# Patient Record
Sex: FEMALE | Race: WHITE
[De-identification: names, ages, dates, MRNs, and addresses within clinical notes are randomized per-mention and may not be internally consistent; named-entity substitution may affect disease eponyms.]

---

## 2018-11-30 ENCOUNTER — HOSPITAL ENCOUNTER (OUTPATIENT)
Dept: HOSPITAL 75 - RAD | Age: 68
End: 2018-11-30
Attending: INTERNAL MEDICINE
Payer: MEDICARE

## 2018-11-30 DIAGNOSIS — Z12.31: Primary | ICD-10-CM

## 2018-11-30 PROCEDURE — 77067 SCR MAMMO BI INCL CAD: CPT

## 2018-11-30 NOTE — DIAGNOSTIC IMAGING REPORT
INDICATION: Routine screening.



Comparison is made with prior mammogram from 03/16/2015.



2-D and 3-D bilateral screening mammography was performed with

CAD.



Scattered fibroglandular densities are identified bilaterally.

The parenchymal pattern is stable. No dominant mass or malignant

appearing microcalcifications are seen. There are benign

calcifications present. The axillae are unremarkable.



IMPRESSION: BI-RADS category 2



No mammographic features suspicious for malignancy are

identified.



ACR BI-RADS Category 2: Benign findings.

Result letter will be mailed to the patient.

Note: At least 10% of breast cancer is not imaged by mammography.



Dictated by: 



  Dictated on workstation # [id number]

## 2018-12-17 ENCOUNTER — HOSPITAL ENCOUNTER (EMERGENCY)
Dept: HOSPITAL 75 - ER | Age: 68
Discharge: HOME | End: 2018-12-17
Payer: MEDICARE

## 2018-12-17 VITALS — HEIGHT: 66 IN | BODY MASS INDEX: 30.22 KG/M2 | WEIGHT: 188 LBS

## 2018-12-17 VITALS — DIASTOLIC BLOOD PRESSURE: 76 MMHG | SYSTOLIC BLOOD PRESSURE: 143 MMHG

## 2018-12-17 DIAGNOSIS — F17.210: ICD-10-CM
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
## 2018-12-17 DIAGNOSIS — Z90.710: ICD-10-CM

## 2018-12-17 DIAGNOSIS — S22.080A: Primary | ICD-10-CM

## 2018-12-17 DIAGNOSIS — K21.9: ICD-10-CM

## 2018-12-17 PROCEDURE — 72131 CT LUMBAR SPINE W/O DYE: CPT

## 2018-12-17 NOTE — XMS REPORT
Continuity of Care Document

 Created on: 2018



SAM ZAVALA

External Reference #: [id number]

: 1950

Sex: Female



Demographics







 Preferred Language  Unknown

 

 Marital Status  Unknown

 

 [employer] Affiliation  Unknown

 

 Race  Unknown

 

 Ethnic Group  Unknown





Author







 Author  [facility]

 

 Address  Unknown

 

 Phone  Unavailable



              



Allergies

      





 Active            Description            Code            Type            
Severity            Reaction            Onset            Reported/Identified   
         Relationship to Patient            Clinical Status        

 

 Yes            No Known Drug Allergies            [id number]            Drug 
Allergy            Mild            N/A                         2010      
                            



                  



Medications

      



There is no data.                  



Problems

      





 Date Dx Coded            Attending            Type            Code            
Diagnosis            Diagnosed By        

 

 2010                         Ot            558.9                        
          

 

 2010                         Ot            599.0                        
          

 

 2010                         Ot            789.09                       
           

 

 10/16/2012                                      V06.1            TDAP DX      
               

 

 10/16/2012                                      V06.1            TDAP DX      
               

 

 2012                                      V05.8            ZOSTAVAX DX  
                   

 

 2012                                      V05.8            ZOSTAVAX DX  
                   

 

 2013                                      079.99            VIRAL 
SYNDROME                     

 

 2013                                      786.2            COUGH        
             

 

 2015            DEDRA JORDAN POONAM SÁNCHEZ            Ot            V58.61   
                               

 

 2015            DEDRA DO POONAM SÁNCHEZ            Ot            V58.83   
                               

 

 2015                         Ot            V76.12                       
           

 

 2015                         Ot            V76.12                       
           

 

 2015                         Ot            780.60                       
           

 

 2015                         Ot            786.50                       
           

 

 2015                         Ot            733.90                       
           

 

 2015                         Ot            V58.69                       
           

 

 2015            DEDRA DO, POONAM SÁNCHEZ            Ot            V58.61   
                               

 

 2015            DEDRA DO, POONAM SÁNCHEZ            Ot            V58.83   
                               

 

 2015                         Ot            V76.12                       
           

 

 2015                         Ot            V76.12                       
           

 

 2015                         Ot            780.60                       
           

 

 2015                         Ot            786.50                       
           

 

 2015                         Ot            733.90                       
           

 

 2015                         Ot            V58.69                       
           

 

 2015            DEDRA DO, POONAM SÁNCHEZ            Ot            V58.61   
                               

 

 2015            DEDRA DO, POONAM SÁNCHEZ            Ot            V58.83   
                               

 

 2015                         Ot            V76.12                       
           

 

 2015            CHAVO MCDONOUGH            Ot            
433.10                                  

 

 2015            CHAVO MCDONOUGHP            Ot            
433.30                                  

 

 2016                         Ot            V76.12                       
           

 

 2016                         Ot            780.60                       
           

 

 2016                         Ot            786.50                       
           

 

 2016                         Ot            733.90                       
           

 

 2016                         Ot            V58.69                       
           

 

 2016            POONAM COLMENARES DO            Ot            V58.61   
                               

 

 2016            POONAM COLMENARES DO            Ot            V58.83   
                               

 

 2016                         Ot            V76.12                       
           

 

 2016            CHAVO MCDONOUGH ARNP            Ot            
433.10                                  

 

 2016            CHAVO MCDONOUGH ARNP            Ot            
433.30                                  

 

 2016            POONAM COLMENARES DO            Ot            Z47.1    
                              

 

 2016            POONAM COLMENARES DO            Ot            Z96.651  
                                

 

 2018            POONAM COLMENARES DO            Ot            V58.61   
         ANTICOAGULANTS,LT,CURRENT USE                     

 

 2018            POONAM COLMENARES DO            Ot            V58.83   
         ENCOUNTER FOR THERAPEUTIC DRUG MONITORIN                     

 

 2018                         Ot            V76.12            OTH SCREEN 
MAMMO-MALIGN NEOPLASM OF BRAD                     

 

 2018            CHAVO MCDONOUGH ARNP            Ot            
433.10            CAROTID ARTERY OCCLUSION W O CEREBRAL IN                     

 

 2018            CHAVO MCDONOUGH ARNP            Ot            
433.30            MULT BILTRAL ARTERY OCCLUSION WO CEREBRA                     

 

 2018            DEDRA JORDAN POONAM SÁNCHEZ            Ot            Z47.1    
        AFTERCARE FOLLOWING JOINT REPLACEMENT ESTRADA                     

 

 2018            DEDRA JORDAN POONAM SÁNCHEZ            Ot            Z96.651  
          PRESENCE OF RIGHT ARTIFICIAL KNEE JOINT                     

 

 2018            PEDRO LUIS MCDONOUGH DO            Ot            Z12.31  
          ENCNTR SCREEN MAMMOGRAM FOR MALIGNANT NE                     

 

 2018            PEDRO LUIS MCDONOUGH DO            Ot            Z12.31  
          ENCNTR SCREEN MAMMOGRAM FOR MALIGNANT NE                     

 

 2018            PEDRO LUIS MCDONOUGH DO            Ot            Z12.31  
          ENCNTR SCREEN MAMMOGRAM FOR MALIGNANT NE                     

 

 2018            PEDRO LUIS MCDONOUGH DO            Ot            Z12.31  
          ENCNTR SCREEN MAMMOGRAM FOR MALIGNANT NE                     



                                                                               
                                           



Procedures

      





 Code            Description            Performed By            Performed On   
     

 

             [id number]                                  [facility] (IN-HOUSE) 
                                  2013        



                  



Results

      



There is no data.              



Encounters

      





 ACCT No.            Visit Date/Time            Discharge            Status    
        Pt. Type            Provider            Facility            Loc./Unit  
          Complaint        

 

 [id number]            2013 08:16:00            2013 23:59:59          
  [facility]            Outpatient                                                    
        

 

 [id number]            2012 08:42:00            2012 23:59:59          
  [facility]            Outpatient                                                    
        

 

 [id number]            2018 09:34:00            2018 23:59:59    
        [facility]            Outpatient            PEDRO LUIS MCDONOUGH DO            
[facility]            RAD            SCREENING        

 

 [id number]            2016 11:38:00            2016 23:59:59    
        [facility]            Outpatient            DEDRA JORDANPOONAM            
[facility]            S/P RIGHT TKA        

 

 [id number]            2015 14:13:00            2015 23:59:59    
        [facility]            Outpatient            CHAVO MCDONOUGH         
   [facility]            RAD            DIZZINESS        

 

 [id number]            2014 14:00:00            2014 23:59:59    
        [facility]            Outpatient            DEDRA JORDAN POONAM GONZALO            
[facility]            TKR, ANTICOAG THERAPY
        

 

 [id number]            2015 11:36:00                                   
   Document Registration                                                       
     

 

 [id number]            08/10/2012 11:22:00                                   
   Document Registration                                                       
     

 

 [id number]            2011 17:05:00                                   
   Document Registration                                                       
     

 

 [id number]            2011 15:36:00                                   
   Document Registration                                                       
     

 

 [id number]            2010 17:57:00                                   
   Document Registration                                                       
     

 

 [id number]            2015 02:01:14                         [place]            
Document Registration

## 2018-12-17 NOTE — DIAGNOSTIC IMAGING REPORT
PROCEDURE: CT lumbar spine without contrast.



TECHNIQUE: Multiple contiguous axial images were obtained through

the lumbar spine without the use of intravenous contrast.

Sagittal and coronal reformations were then performed.



INDICATION: Recent fall. Severe lower back pain.



COMPARISON: None.



FINDINGS: For the purposes of this exam, last well-formed disc

space is denoted the L5-S1 level. Evaluation of the static

alignment demonstrates mild grade 1 retrolisthesis at L3-L4 and

mild grade 1 anterolisthesis at L4-L5. There is no evidence of

jumped facets. Note is also made of mild S-shaped scoliotic

deformity of the lumbar spine.



Evaluation of the lumbar vertebral body heights demonstrates

chronic appearing height loss at L5. Otherwise, lumbar vertebral

body heights are maintained. There is, however, acute appearing

compression fracture involving the superior endplate of T12. This

results in approximately 30% vertebral body height loss. There is

mild bulging of the posterior vertebral body wall, superiorly.

This results in minimal narrowing of the spinal canal. Note is

also made of sclerotic appearance to the anteroinferior corner of

T11. This, however, is likely on a degenerative basis.



There are multilevel degenerative changes consisting of

intervertebral disc height loss with anterior and posterior disc

bulge as well as multilevel facet arthropathy and ligamentum

flavum laxity. Evaluation of the spinal canal contents is

suboptimal given CT modality and lack of intrathecal contrast,

but there does appear to be moderate or possibly

moderate-to-severe spinal canal stenosis at the L4-L5 level and

probable moderate stenosis at L3-L4. Evaluation of the pre- and

para-vertebral soft tissue structures is unremarkable. Note is

made of calcified aortic and arterial atherosclerosis.



IMPRESSION:

1. Acute compression fracture involving the superior endplate of

T12. Patient may be a candidate for kyphoplasty.

2. Multilevel degenerative changes of the lumbar spine, greatest

at the L3-L4 and L4-L5 levels as described above.



Dictated by: 



  Dictated on workstation # [id number]

## 2018-12-17 NOTE — ED BACK PAIN
General


Chief Complaint:  Back Problems


Stated Complaint:  BACK PAIN


Nursing Triage Note:  


PT PRESENTS TO ER WITH COMPLAINT OF LOW BACK PAIN. STATES SHE MISSED A STEP AND 


FELL LAST FRIDAY.


Nursing Sepsis Screen:  No Definite Risk


Source of Information:  Patient


Exam Limitations:  No Limitations





History of Present Illness


Date Seen by Provider:  Dec 17, 2018


Time Seen by Provider:  14:30


Initial Comments


Patient is a 68-year-old female who presents to the emergency room with 

complaints of low back pain after a fall on 12/13/18. She reports that she 

missed a bottom step causing her to fall, she has had some low back pain ever 

since. She denies any loss of bowel or bladder, numbness or tingling that 

radiates to her legs, denies hitting her head, neck or denies any other 

injuries from the fall.


Location:  Lumbar Spine


Timing/Duration:  3-4 Days


Pain/Injury Location:  Back





Allergies and Home Medications


Allergies


Coded Allergies:  


     No Known Drug Allergies (Unverified  Allergy, Mild, 3/17/10)





Past Medical-Social-Family Hx


Patient Social History


Alcohol Use:  Denies Use


Recreational Drug Use:  No


Smoking Status:  Current Everyday Smoker


Type Used:  Cigarettes


Recent Foreign Travel:  No


Contact w/Someone Who Travel:  No


Recent Infectious Disease Expo:  No


Recent Hopitalizations:  No





Immunizations Up To Date


Tetanus Booster (TDap):  Less than 5yrs


PED Vaccines UTD:  Yes





Seasonal Allergies


Seasonal Allergies:  No





Past Medical History


Surgeries:  Yes


Hysterectomy, Orthopedic


Respiratory:  No


Cardiac:  No


Neurological:  No


GYN History:  Hysterectomy


Genitourinary:  No


Gastroesophageal Reflux


Endocrine:  No


HEENT:  No


Cancer:  No


Integumentary:  No





Physical Exam


Vital Signs





Vital Signs - First Documented








 12/17/18





 14:27


 


Temp 97.0


 


Pulse 87


 


Resp 20


 


B/P (MAP) 143/76 (98)


 


Pulse Ox 97





Capillary Refill : Less Than 3 Seconds


Height, Weight, BMI


Height: 5'6.00"


Weight: 188lbs. oz. 85.730690km;  BMI


Method:Stated





Progress/Results/Core Measures


Results/Orders


My Orders





Orders - ADENIKE PAGE


Ct Lumbar Spine Wo (12/17/18 14:39)


Ketorolac Injection (Toradol Injection) (12/17/18 14:45)


Orphenadrine Injection (Norflex Injectio (12/17/18 14:45)





Medications Given in ED





Current Medications








 Medications  Dose


 Ordered  Sig/Iris


 Route  Start Time


 Stop Time Status Last Admin


Dose Admin


 


 Ketorolac


 Tromethamine  60 mg  ONCE  ONCE


 IM  12/17/18 14:45


 12/17/18 14:46 DC 12/17/18 14:49


60 MG


 


 Orphenadrine


 Citrate  60 mg  ONCE  ONCE


 IM  12/17/18 14:45


 12/17/18 14:46 DC 12/17/18 14:49


60 MG








Vital Signs/I&O











 12/17/18





 14:27


 


Temp 97.0


 


Pulse 87


 


Resp 20


 


B/P (MAP) 143/76 (98)


 


Pulse Ox 97














Blood Pressure Mean:  98











Departure


Impression





 Primary Impression:  


 Compression fracture of T12 vertebra


Disposition:  01 HOME, SELF-CARE


Condition:  Stable/Unchanged





Departure-Patient Inst.


Decision time for Depature:  15:33


Referrals:  


EFREM FLOWER WILLIAM J DO (PCP/Family)


Primary Care Physician


Patient Instructions:  Vertebral Compression Fracture (DC)





Add. Discharge Instructions:  


Take medications as directed. Follow-up with your primary care provider within 

1 week for recheck. Call Dr. Flower's office today for an appointment time for 

further evaluation of you compression fracture. Return back to the emergency 

room for any worsening symptoms or concerns as needed. All discharge 

instructions reviewed with patient and/or family. Voiced understanding.


Scripts


Hydrocodone Bit/Acetaminophen (Hydrocodone/Acetaminophen 5/325mg Tablet) 1 Tab 

Tab


1 EACH PO Q4-6HR PRN for PAIN-MODERATE MDD 10, #20 TAB


   Prov: ADENIKE PAGE         12/17/18 


Cyclobenzaprine HCl (Cyclobenzaprine HCl) 10 Mg Tablet


10 MG PO Q8H PRN for SPASMS, #14 TAB


   Prov: ADENIKE PAGE         12/17/18











ADENIKE PAGE Dec 17, 2018 15:37

## 2019-01-24 ENCOUNTER — HOSPITAL ENCOUNTER (OUTPATIENT)
Dept: HOSPITAL 75 - RAD | Age: 69
End: 2019-01-24
Attending: INTERNAL MEDICINE
Payer: MEDICARE

## 2019-01-24 DIAGNOSIS — M85.80: ICD-10-CM

## 2019-01-24 DIAGNOSIS — M81.0: Primary | ICD-10-CM

## 2019-01-24 PROCEDURE — 77080 DXA BONE DENSITY AXIAL: CPT

## 2019-01-24 NOTE — DIAGNOSTIC IMAGING REPORT
INDICATION: Osteopenia



COMPARISON: 18 2012



FINDINGS:



AP Spine L1-L4:  

[BMD (g/cm2): 0.945] [T-Score: -2.1] [Z-Score: -1.2]

[BMD Previous: 1.085] [BMD % Change: -12.9]



LT Hip Neck:       

[BMD (g/cm2): 0.814] [T-Score: -1.6] [Z-Score: -0.5]



LT Hip Total:       

[BMD (g/cm2):0.815] [T-Score:-1.5] [Z-Score: -0.7]

[BMD Previous: .884] [BMD % Change: NA]



RT Hip Neck:      

[BMD (g/cm2):0.884] [T-Score:-1.1] [Z-Score:0.0]



RT Hip Total:      

[BMD (g/cm2):0.839] [T-score:-1.3] [Z-Score:-0.5]

[BMD Previous:.890] [BMD % Change:NA]



*Indicates significant change from prior examination based on 95%

confidence level.



World Health Organization criteria for BMD interpretation

classify patients as Normal (T-score at or above -1.0),

Osteopenic (T-score between -1.0 and -2.5) or Osteoporotic

(T-score at or below -2.5).



LIMITATIONS AND MODIFICATION:  None.



FRACTURE RISK (FRAX SCORE):

The ten year probability of (%): 

Major Osteoporotic Fracture: [16]

Hip Fracture: [3.3]



IMPRESSION:

1. Osteopenia (Low bone mass).

2. Bone Mineral density has decreased by a statistically

significant amount, as detailed above. 

3. See below National Osteoporosis Foundation guidelines on when

to potentially initiate pharmacologic therapy. 



Based on the National Osteoporosis Foundation Guidelines,

pharmacologic treatment should be initiated in any of the

following, unless clinical conditions suggest otherwise:



*  Any patient with prior fragility fracture of the hip or

vertebrae. A spine fracture indicates 5X risk for subsequent

spine fracture and 2X risk for subsequent hip fracture.



*  Osteoporosis (T-score <-2.5).



*  Postmenopausal women and men age 50 and older with low bone

mass/osteopenia (T-score between -1.0 and -2.5) by DXA and

10-year major osteoporotic fracture greater than 20% or a 10-year

probability of hip fracture greater than 3%. These fracture risks

are supplied above in the FRAX score, if applicable.



*  Clinician judgement and/or patient preferences may indicate

treatment for people with 10-year fracture probabilities above or

below these levels.



Dictated by: 



  Dictated on workstation # [id number]

## 2019-02-27 ENCOUNTER — HOSPITAL ENCOUNTER (OUTPATIENT)
Dept: HOSPITAL 75 - RAD | Age: 69
Discharge: HOME | End: 2019-02-27
Attending: INTERNAL MEDICINE
Payer: MEDICARE

## 2019-02-27 VITALS — WEIGHT: 185 LBS | BODY MASS INDEX: 29.73 KG/M2 | HEIGHT: 66 IN

## 2019-02-27 VITALS — DIASTOLIC BLOOD PRESSURE: 78 MMHG | SYSTOLIC BLOOD PRESSURE: 149 MMHG

## 2019-02-27 DIAGNOSIS — M81.0: Primary | ICD-10-CM

## 2021-03-12 ENCOUNTER — HOSPITAL ENCOUNTER (OUTPATIENT)
Dept: HOSPITAL 75 - RAD | Age: 71
End: 2021-03-12
Attending: INTERNAL MEDICINE
Payer: MEDICARE

## 2021-03-12 DIAGNOSIS — Z12.31: Primary | ICD-10-CM

## 2021-03-12 PROCEDURE — 77067 SCR MAMMO BI INCL CAD: CPT

## 2021-03-12 PROCEDURE — 77063 BREAST TOMOSYNTHESIS BI: CPT

## 2021-03-15 NOTE — DIAGNOSTIC IMAGING REPORT
INDICATION: Routine screening.



Comparison is made with prior mammogram 11/30/2018 and 3/16/2015.



2-D and 3-D bilateral screening mammography was performed with

CAD.



Both breast are heterogeneously dense, limiting the sensitivity

of mammography. Parenchymal pattern is stable. No mass or

malignant appearing microcalcifications are seen. Axillae are

unremarkable. 



IMPRESSION: BI-RADS Category 2



No mammographic features suspicious for malignancy are

identified.



ACR BI-RADS Category 2: Benign findings.

Result letter will be mailed to the patient.

Note: At least 10% of breast cancer is not imaged by mammography.



Dictated by: 



  Dictated on workstation # [id number]

## 2021-07-23 ENCOUNTER — HOSPITAL ENCOUNTER (EMERGENCY)
Dept: HOSPITAL 75 - ER | Age: 71
Discharge: HOME | End: 2021-07-23
Payer: MEDICARE

## 2021-07-23 VITALS — HEIGHT: 66.02 IN | BODY MASS INDEX: 24.8 KG/M2 | WEIGHT: 154.32 LBS

## 2021-07-23 VITALS — SYSTOLIC BLOOD PRESSURE: 156 MMHG | DIASTOLIC BLOOD PRESSURE: 91 MMHG

## 2021-07-23 DIAGNOSIS — Z20.822: ICD-10-CM

## 2021-07-23 DIAGNOSIS — Z96.651: ICD-10-CM

## 2021-07-23 DIAGNOSIS — Z79.891: ICD-10-CM

## 2021-07-23 DIAGNOSIS — M54.9: ICD-10-CM

## 2021-07-23 DIAGNOSIS — S72.491A: Primary | ICD-10-CM
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
## 2021-07-23 DIAGNOSIS — Z79.899: ICD-10-CM

## 2021-07-23 DIAGNOSIS — G89.29: ICD-10-CM

## 2021-07-23 DIAGNOSIS — F17.210: ICD-10-CM

## 2021-07-23 LAB
ALBUMIN SERPL-MCNC: 4.2 GM/DL (ref 3.2–4.5)
ALP SERPL-CCNC: 83 U/L (ref 40–136)
ALT SERPL-CCNC: 32 U/L (ref 0–55)
APTT BLD: 27 SEC (ref 24–35)
BASOPHILS # BLD AUTO: 0 10^3/UL (ref 0–0.1)
BASOPHILS NFR BLD AUTO: 0 % (ref 0–10)
BILIRUB SERPL-MCNC: 0.7 MG/DL (ref 0.1–1)
BUN/CREAT SERPL: 18
CALCIUM SERPL-MCNC: 8.9 MG/DL (ref 8.5–10.1)
CHLORIDE SERPL-SCNC: 92 MMOL/L (ref 98–107)
CO2 SERPL-SCNC: 22 MMOL/L (ref 21–32)
CREAT SERPL-MCNC: 1.1 MG/DL (ref 0.6–1.3)
EOSINOPHIL # BLD AUTO: 0 10^3/UL (ref 0–0.3)
EOSINOPHIL NFR BLD AUTO: 0 % (ref 0–10)
GFR SERPLBLD BASED ON 1.73 SQ M-ARVRAT: 49 ML/MIN
GLUCOSE SERPL-MCNC: 125 MG/DL (ref 70–105)
HCT VFR BLD CALC: 35 % (ref 35–52)
HGB BLD-MCNC: 12.3 G/DL (ref 11.5–16)
INR PPP: 1 (ref 0.8–1.4)
LYMPHOCYTES # BLD AUTO: 1.6 10^3/UL (ref 1–4)
LYMPHOCYTES NFR BLD AUTO: 14 % (ref 12–44)
MANUAL DIFFERENTIAL PERFORMED BLD QL: NO
MCH RBC QN AUTO: 31 PG (ref 25–34)
MCHC RBC AUTO-ENTMCNC: 35 G/DL (ref 32–36)
MCV RBC AUTO: 87 FL (ref 80–99)
MONOCYTES # BLD AUTO: 0.7 10^3/UL (ref 0–1)
MONOCYTES NFR BLD AUTO: 7 % (ref 0–12)
NEUTROPHILS # BLD AUTO: 8.7 10^3/UL (ref 1.8–7.8)
NEUTROPHILS NFR BLD AUTO: 78 % (ref 42–75)
PLATELET # BLD: 278 10^3/UL (ref 130–400)
PMV BLD AUTO: 9 FL (ref 9–12.2)
POTASSIUM SERPL-SCNC: 3.4 MMOL/L (ref 3.6–5)
PROT SERPL-MCNC: 6.9 GM/DL (ref 6.4–8.2)
PROTHROMBIN TIME: 13.4 SEC (ref 12.2–14.7)
SODIUM SERPL-SCNC: 127 MMOL/L (ref 135–145)
WBC # BLD AUTO: 11.1 10^3/UL (ref 4.3–11)

## 2021-07-23 PROCEDURE — 85730 THROMBOPLASTIN TIME PARTIAL: CPT

## 2021-07-23 PROCEDURE — 80053 COMPREHEN METABOLIC PANEL: CPT

## 2021-07-23 PROCEDURE — 85610 PROTHROMBIN TIME: CPT

## 2021-07-23 PROCEDURE — 85025 COMPLETE CBC W/AUTO DIFF WBC: CPT

## 2021-07-23 PROCEDURE — 73552 X-RAY EXAM OF FEMUR 2/>: CPT

## 2021-07-23 PROCEDURE — 96376 TX/PRO/DX INJ SAME DRUG ADON: CPT

## 2021-07-23 PROCEDURE — 73590 X-RAY EXAM OF LOWER LEG: CPT

## 2021-07-23 PROCEDURE — 36415 COLL VENOUS BLD VENIPUNCTURE: CPT

## 2021-07-23 PROCEDURE — 72170 X-RAY EXAM OF PELVIS: CPT

## 2021-07-23 PROCEDURE — 87636 SARSCOV2 & INF A&B AMP PRB: CPT

## 2021-07-23 PROCEDURE — 73562 X-RAY EXAM OF KNEE 3: CPT

## 2021-07-23 PROCEDURE — 71045 X-RAY EXAM CHEST 1 VIEW: CPT

## 2021-07-23 PROCEDURE — 96374 THER/PROPH/DIAG INJ IV PUSH: CPT

## 2021-07-23 NOTE — DIAGNOSTIC IMAGING REPORT
EXAMINATION: Pelvis, single view.



COMPARISON: None. 



HISTORY: 71-year-old female, pelvic pain. 



FINDINGS: The pubic symphysis and sacroiliac joints are normally

aligned bilaterally. The hips are not obviously dislocated. There

is no identified acute fracture. There are degenerative changes

of the lower lumbar spine. 



IMPRESSION: No identified acute bony abnormality of the pelvis. 



 



Dictated by: 



  Dictated on workstation # [id number]

## 2021-07-23 NOTE — ED LOWER EXTREMITY
General


Chief Complaint:  Lower Extremity


Stated Complaint:  FALL/R LEG INJ/PREV KNEE REPLACEMENT


Nursing Triage Note:  


Pt arrival by wheelchair with complaint of R. leg pain secondary to fall. Pt has




deformity to lower right leg just below knee, and pain to right femur and 


pelvis.


Source:  patient





History of Present Illness


Date Seen by Provider:  Jul 23, 2021


Time Seen by Provider:  19:42


Initial Comments


PT ARRIVES VIA POV FROM HOME, NEEDS WHEELCHAIR ON ARRIVAL


PT STATES JUST PRIOR TO ARRIVAL, SHE WAS WALKING IN HER YARD AND HER DOG RAN 

INTO HER RIGHT KNEE AND SHE FELL BACKWARDS, LANDING ON HER BUTTOCKS ON THE 

GROUND


C/O SEVERE RIGHT KNEE PAIN--STATES THIS IS WHERE THE DOG RAN INTO HER


DENIES BACK OR BUTTOCKS PAIN 


DENIES HIP PAIN 


DID NOT HIT HEAD AND NO LOSS OF CONSCIOUSNESS


NO NECK PAIN 


NO PARESTHESIAS OR MOTOR DEFICITS


DENIES ANY OTHER INJURIES OR AREAS OF PAIN 





PT HAS HAD PRIOR RIGHT TOTAL KNEE REPLACEMENT BY DR. COLMENARES





PCP: DR. MCDONOUGH


ORTHOPEDIC SURGEON: DR. COLMENARES





Allergies and Home Medications


Allergies


Coded Allergies:  


     No Known Drug Allergies (Unverified , 3/17/10)





Home Medications


Cyclobenzaprine HCl 10 Mg Tablet, 10 MG PO Q8H PRN for SPASMS


   Prescribed by: ADENIKE PAGE on 12/17/18 1537


Hydrocodone Bit/Acetaminophen 1 Tab Tab, 1 EACH PO Q4-6HR PRN for PAIN-MODERATE


   Prescribed by: ADENIKE PAGE on 12/17/18 1537


Hydrocodone Bit/Acetaminophen 1 Ea Tablet, 1-2 EA PO Q4-6 PRN for PAIN


   Prescribed by: ARTURO VENEGAS on 7/23/21 2210





Patient Home Medication List


Home Medication List Reviewed:  Yes





Review of Systems


Constitutional:  no symptoms reported


Respiratory:  no symptoms reported


Cardiovascular:  no symptoms reported


Gastrointestinal:  no symptoms reported


Genitourinary:  no symptoms reported


Musculoskeletal:  see HPI


Skin:  no symptoms reported


Psychiatric/Neurological:  No Symptoms Reported





Past Medical-Social-Family Hx


Patient Social History


Tobacco Use?:  Yes (SMOKES 2 PPD)


Tobacco type used:  Cigarettes


Smoking Status:  Current Everyday Smoker


Use of E-Cig and/or Vaping dev:  No


Substance use?:  No


Alcohol Use?:  No


Pt feels they are or have been:  No





Immunizations Up To Date


Tetanus Booster (TDap):  Less than 5yrs


PED Vaccines UTD:  Yes


Influenza Vaccine Up-to-Date:  No; Not Current


Second COVID19 Vaccination Lasha:  2/21/21


COVID19 Vaccine :  Moderna





Seasonal Allergies


Seasonal Allergies:  No





Past Medical History


Surgery/Hospitalization HX:  


BILATERAL TOTAL KNEE REPLACEMENTS


HYSTERECTOMY


KYPHOPLASTIES


Surgeries:  Yes


Hysterectomy, Joint Replacement, Orthopedic


Respiratory:  No


Cardiac:  Yes


Chronic Edema/Swelling


Neurological:  No


GYN History:  Hysterectomy, Menopausal


Genitourinary:  No


Gastrointestinal:  Yes


Gastroesophageal Reflux


Musculoskeletal:  Yes (BILAT TKR; FIBROMAYALGIA;KYPHOPLASTIES)


Arthritis, Chronic Back Pain, Fractures


Endocrine:  No


HEENT:  No


Cancer:  No


Psychosocial:  No


Integumentary:  No


Blood Disorders:  No





Physical Exam


Vital Signs





Vital Signs - First Documented








 7/23/21





 20:02


 


Temp 36.7


 


Pulse 82


 


Resp 20


 


B/P (MAP) 140/75 (96)


 


Pulse Ox 97


 


O2 Delivery Room Air





Capillary Refill : Less Than 3 Seconds


Height, Weight, BMI


Height: 5'6.00"


Weight: 185lbs. 0.0oz. [id number]; 24.00 BMI


Method:Stated


General Appearance:  WD/WN


Neck:  non-tender, full range of motion, normal inspection


Cardiovascular:  normal peripheral pulses, regular rate, rhythm, no murmur


Respiratory:  chest non-tender, normal breath sounds, no respiratory distress, 

no accessory muscle use


Gastrointestinal:  normal bowel sounds, non tender, soft


Hips:  bilateral hip non-tender


Legs:  left leg non-tender, left leg normal inspection, left leg normal range of

motion, left leg no evidence of injury; right leg other (TENDERNESS FROM RIGHT 

MID FEMUR AREA, DOWN TO RIGHT MID TIB-FIB AREA, WITH MARKED TENDERNESS TO RIGHT 

KNEE)


Knees:  left knee normal inspection; right knee other (SIGNIFICANT SWELLING TO 

KNEE, UNABLE TO MOVE KNEE DUE TO PAIN)


Ankles:  bilateral ankle non-tender, bilateral ankle normal inspection


Feet:  bilateral foot non-tender, bilateral foot normal inspection, bilateral 

foot normal range of motion, bilateral foot other (STRONG PEDAL PULSES 

BILATERALLY; TRACE PEDAL EDEMA BILATERALLY; DISTAL SENSORY/MOTOR/VASCULAR 

INTACT)


Neurologic/Tendon:  normal sensation, normal motor functions, normal tendon 

functions


Neurologic/Psychiatric:  CNs II-XII nml as tested, no motor/sensory deficits, 

alert, oriented x 3


Skin:  normal color, warm/dry





Procedures/Interventions


Splinting and Joint Reduction :  


   Ace wrap:  Yes


   Immobilizers:  Flexion Limit Knee Long


   Ordered:  Other (PT HAS A WALKER AT HOME)





Progress/Results/Core Measures


Results/Orders


Lab Results





Laboratory Tests








Test


 7/23/21


19:58 7/23/21


21:45 Range/Units


 


 


White Blood Count


 11.1 H


 


 4.3-11.0


10^3/uL


 


Red Blood Count


 4.01 


 


 3.80-5.11


10^6/uL


 


Hemoglobin 12.3   11.5-16.0  g/dL


 


Hematocrit 35   35-52  %


 


Mean Corpuscular Volume 87   80-99  fL


 


Mean Corpuscular Hemoglobin 31   25-34  pg


 


Mean Corpuscular Hemoglobin


Concent 35 


 


 32-36  g/dL





 


Red Cell Distribution Width 12.8   10.0-14.5  %


 


Platelet Count


 278 


 


 130-400


10^3/uL


 


Mean Platelet Volume 9.0   9.0-12.2  fL


 


Immature Granulocyte % (Auto) 1    %


 


Neutrophils (%) (Auto) 78 H  42-75  %


 


Lymphocytes (%) (Auto) 14   12-44  %


 


Monocytes (%) (Auto) 7   0-12  %


 


Eosinophils (%) (Auto) 0   0-10  %


 


Basophils (%) (Auto) 0   0-10  %


 


Neutrophils # (Auto)


 8.7 H


 


 1.8-7.8


10^3/uL


 


Lymphocytes # (Auto)


 1.6 


 


 1.0-4.0


10^3/uL


 


Monocytes # (Auto)


 0.7 


 


 0.0-1.0


10^3/uL


 


Eosinophils # (Auto)


 0.0 


 


 0.0-0.3


10^3/uL


 


Basophils # (Auto)


 0.0 


 


 0.0-0.1


10^3/uL


 


Immature Granulocyte # (Auto)


 0.1 


 


 0.0-0.1


10^3/uL


 


Prothrombin Time 13.4   12.2-14.7  SEC


 


INR Comment 1.0   0.8-1.4  


 


Activated Partial


Thromboplast Time 27 


 


 24-35  SEC





 


Sodium Level 127 L  135-145  MMOL/L


 


Potassium Level 3.4 L  3.6-5.0  MMOL/L


 


Chloride Level 92 L    MMOL/L


 


Carbon Dioxide Level 22   21-32  MMOL/L


 


Anion Gap 13   5-14  MMOL/L


 


Blood Urea Nitrogen 20 H  7-18  MG/DL


 


Creatinine


 1.10 


 


 0.60-1.30


MG/DL


 


Estimat Glomerular Filtration


Rate 49 


 


  





 


BUN/Creatinine Ratio 18    


 


Glucose Level 125 H    MG/DL


 


Calcium Level 8.9   8.5-10.1  MG/DL


 


Corrected Calcium 8.7   8.5-10.1  MG/DL


 


Total Bilirubin 0.7   0.1-1.0  MG/DL


 


Aspartate Amino Transf


(AST/SGOT) 22 


 


 5-34  U/L





 


Alanine Aminotransferase


(ALT/SGPT) 32 


 


 0-55  U/L





 


Alkaline Phosphatase 83     U/L


 


Total Protein 6.9   6.4-8.2  GM/DL


 


Albumin 4.2   3.2-4.5  GM/DL


 


SARS-CoV-2 RNA (RT-PCR)  Not Detected  Not Detecte  








My Orders





Orders - ARTURO VENEGAS DO


Ed Iv/Invasive Line Start (7/23/21 19:48)


Cbc With Automated Diff (7/23/21 19:48)


Comprehensive Metabolic Panel (7/23/21 19:48)


Protime With Inr (7/23/21 19:48)


Partial Thromboplastin Time (7/23/21 19:48)


Fentanyl  Inj (Sublimaze Injection) (7/23/21 19:48)


Chest 1 View, Ap/Pa Only (7/23/21 19:48)


Femur, Right, 2 Views (7/23/21 19:48)


Tibia/Fibula, Right, 2 Views (7/23/21 19:48)


Knee, Right, 3 Views (7/23/21 19:48)


Pelvis (7/23/21 19:48)


Catheter(Urinary) Insert & Ass 03,15 (7/23/21 21:31)


Covid 19 Inhouse Test (7/23/21 21:31)


Ace Bandage (7/23/21 21:56)


Walker (7/23/21 21:56)


Knee Immobilizer (7/23/21 21:56)


Flexion Limit Knee (7/23/21 21:56)


Fentanyl  Inj (Sublimaze Injection) (7/23/21 21:56)


Rx-Hydrocodone/Apap 5-325 Mg (Rx-Vicodin (7/23/21 22:15)





Medications Given in ED





Current Medications








 Medications  Dose


 Ordered  Sig/Iris


 Route  Start Time


 Stop Time Status Last Admin


Dose Admin


 


 Acetaminophen/


 Hydrocodone Bitart  1 ea  Q4H  PRN


 PO  7/23/21 22:15


 7/23/21 23:40 DC 7/23/21 22:38


1 EA








Vital Signs/I&O











 7/23/21 7/23/21





 20:02 23:38


 


Temp 36.7 


 


Pulse 82 76


 


Resp 20 18


 


B/P (MAP) 140/75 (96) 156/91


 


Pulse Ox 97 97


 


O2 Delivery Room Air Room Air














Blood Pressure Mean:                    96











Diagnostic Imaging





Comments


XRAYS--ALL PER RADIOLOGIST REPORTS AT 2123





PELVIS-FINDINGS: The pubic symphysis and sacroiliac joints are normally


aligned bilaterally. The hips are not obviously dislocated. There


is no identified acute fracture. There are degenerative changes


of the lower lumbar spine. 





IMPRESSION: No identified acute bony abnormality of the pelvis.





RIGHT FEMUR--


IMPRESSION: 


1. Displaced right femoral fracture centered in the region of the


femoral component of the knee prosthesis with evidence of


intra-articular fracture extension manifested by a large knee


joint effusion with fat/fluid level.


2. No identified more proximally located fracture of the right


femur. 








RIGHT KNEE--IMPRESSION: 


1. Displaced distal femoral fracture centered in the region of


the femoral component of the knee prosthesis with involvement of


the metaphysis and findings consistent with intra-articular


fracture extension given the presence of a large right knee joint


effusion with fat/fluid level. 








RIGHT TIB-FIB--


IMPRESSION: 


1. Displaced distal femoral fracture centered in the region of


the knee prosthesis. There is a large knee joint effusion with


fat/fluid level consistent with intra-articular fracture.


2. No identified acute fracture specifically of the right tibia


or fibula. 





CXR--FINDINGS: Heart size and mediastinal contours are unremarkable.


There is no identified pneumothorax. There is no large pleural


effusion. There is no identified focal airspace consolidation.


There is no identified significantly displaced rib fracture.


There are prior kyphoplasty changes of T12.





IMPRESSION: No identified acute cardiopulmonary abnormality.


   Reviewed:  Reviewed by Me





Departure


Communication (Admissions)


2123--CALLED [facility], PAGING ORTHOPEDIC SURGEON. THEIR FACILITY IS ON FULL 

DIVERSION


2127--SPOKE WITH DR. GAMBOA, ASKS THAT PICTURES OF XRAYS BE TEXTED TO HIM AND HE

WILL CALL BACK


2151--SPOKE WITH DR. GAMBOA, HE ADVISES TO PLACE PT IN HINGED KNEE BRACE, WALKER

WITH COMPLETE NON-WEIGHT BEARING, AND PT CAN FOLLOW UP WITH DR. COLMENARES ON 

MONDAY. PT IS AGREEABLE TO THIS PLAN





Impression





   Primary Impression:  


   CLOSED RIGHT DISTAL FEMUR FRACTURE


   Additional Impression:  


   History of total right knee replacement (TKR)


Disposition:  01 HOME, SELF-CARE


Condition:  Stable





Departure-Patient Inst.


Decision time for Depature:  21:55


Referrals:  


POONAM COLMENARES WILLIAM J DO (PCP/Family)


Primary Care Physician


Patient Instructions:  Femur Fracture (DC), How to Use a Walker, Knee 

Immobilizer (DC)





Add. Discharge Instructions:  


ICE TO AREA AT 20 MINUTE INTERVALS





ELEVATE LEG AS MUCH As POSSIBLE





ACE WRAP AND KNEE IMMOBILIZER AT ALL TIMES





USE WALKER AT ALL TIMES--NO WEIGHT BEARING AT ANY TIME





FOLLOW UP WITH DR. COLMENARES ON MONDAY--CALL MONDAY MORNING TO SCHEDULE 

APPOINTMENT TIME





All discharge instructions reviewed with patient and/or family. Voiced 

understanding.


Scripts


Hydrocodone Bit/Acetaminophen (HYDROcodone/APAP 7.5/325 TAB) 1 Ea Tablet


1-2 EA PO Q4-6 PRN for PAIN, #20 TAB


   Prov: ARTURO VENEGAS DO         7/23/21











ARTURO VENEGAS DO                 Jul 23, 2021 22:10

## 2021-07-23 NOTE — DIAGNOSTIC IMAGING REPORT
EXAMINATION: Chest radiograph, portable AP view.



DATE: 7/23/2021 8:42 PM



INDICATION: 71-year-old female, fall. Chest pain.



COMPARISON:  None.



FINDINGS: Heart size and mediastinal contours are unremarkable.

There is no identified pneumothorax. There is no large pleural

effusion. There is no identified focal airspace consolidation.

There is no identified significantly displaced rib fracture.

There are prior kyphoplasty changes of T12.



IMPRESSION: No identified acute cardiopulmonary abnormality.



Dictated by: 



  Dictated on workstation # [id number];

## 2021-07-23 NOTE — DIAGNOSTIC IMAGING REPORT
EXAMINATION: Right knee radiographs, 3 views.



COMPARISON: None. 



HISTORY: 71-year-old female, knee pain. Fall. 



FINDINGS: There is a displaced right distal femoral fracture

involving at least the metaphysis centered in the region of the

femoral component of the knee prosthesis. There is a large right

knee joint effusion with fat/fluid level consistent with an

intra-articular fracture. The hardware itself appears intact. The

patella appears unremarkable in position. 



IMPRESSION: 

1. Displaced distal femoral fracture centered in the region of

the femoral component of the knee prosthesis with involvement of

the metaphysis and findings consistent with intra-articular

fracture extension given the presence of a large right knee joint

effusion with fat/fluid level. 



Dictated by: 



  Dictated on workstation # [id number]

## 2021-07-23 NOTE — DIAGNOSTIC IMAGING REPORT
EXAMINATION: Right tibia and fibular radiographs, 2 views, 4

images.



COMPARISON: None. 



HISTORY: 71-year-old female, fall. Leg pain. 



FINDINGS: There is a displaced right distal femoral fracture in

the region of the femoral component of the right total knee

prosthesis. There is a knee joint effusion with fat/fluid level

consistent with an intra-articular fracture. The patella appears

normally positioned. There is no identified acute fracture

specifically involving the right tibia or fibula. 



IMPRESSION: 

1. Displaced distal femoral fracture centered in the region of

the knee prosthesis. There is a large knee joint effusion with

fat/fluid level consistent with intra-articular fracture.

2. No identified acute fracture specifically of the right tibia

or fibula. 



Dictated by: 



  Dictated on workstation # [id number]

## 2021-07-23 NOTE — DIAGNOSTIC IMAGING REPORT
EXAMINATION: Right femur, 2 views, 4 images.



COMPARISON: None. 



HISTORY: 71-year-old female, right femur pain. Fall. 



FINDINGS: The right hip is not dislocated. There is no joint

space loss of the right hip, osteophyte formation, or subchondral

cystic change. There is a displaced right femoral fracture

involving at least the distal femoral metaphysis with evidence of

intra-articular fracture extension manifested by a large right

knee joint effusion with a fat fluid level. There is no

identified more proximally located fracture of the right femur.  





IMPRESSION: 

1. Displaced right femoral fracture centered in the region of the

femoral component of the knee prosthesis with evidence of

intra-articular fracture extension manifested by a large knee

joint effusion with fat/fluid level.

2. No identified more proximally located fracture of the right

femur. 



Dictated by: 



  Dictated on workstation # [id number]

## 2021-08-31 ENCOUNTER — HOSPITAL ENCOUNTER (OUTPATIENT)
Dept: HOSPITAL 75 - RAD | Age: 71
End: 2021-08-31
Attending: INTERNAL MEDICINE
Payer: MEDICARE

## 2021-08-31 DIAGNOSIS — M81.0: ICD-10-CM

## 2021-08-31 DIAGNOSIS — Z78.0: ICD-10-CM

## 2021-08-31 DIAGNOSIS — M85.80: Primary | ICD-10-CM

## 2021-08-31 PROCEDURE — 77080 DXA BONE DENSITY AXIAL: CPT

## 2021-08-31 NOTE — DIAGNOSTIC IMAGING REPORT
INDICATION: 71-year-old asymptomatic postmenopausal female.



COMPARISON: 1/24/2019



FINDINGS:



AP Spine L1-L4:  

[BMD (g/cm2): 1.019] [T-Score: -1.5] [Z-Score: -0.2]

[BMD Previous: 0.945] [BMD % Change: 7.8]



LT Hip Neck:       

[BMD (g/cm2): 0.868] [T-Score: -1.2] [Z-Score: 0.3]



LT Hip Total:       

[BMD (g/cm2):0.834] [T-Score:-1.4] [Z-Score: -0.1]

[BMD Previous: 0.815] [BMD % Change: 2.3]



RT Hip Neck:      

[BMD (g/cm2):0.882] [T-Score:-1.1] [Z-Score:0.4]



RT Hip Total:      

[BMD (g/cm2):0.842] [T-score:-1.3] [Z-Score:-0.1]

[BMD Previous:0.839] [BMD % Change:0.4]



*Indicates significant change from prior examination based on 95%

confidence level.



World Health Organization criteria for BMD interpretation

classify patients as Normal (T-score at or above -1.0),

Osteopenic (T-score between -1.0 and -2.5) or Osteoporotic

(T-score at or below -2.5).



LIMITATIONS AND MODIFICATION:  None.



FRACTURE RISK (FRAX SCORE):

The ten year probability of (%): 

Major Osteoporotic Fracture: [15.1]

Hip Fracture: [1.9]



IMPRESSION:

1. Osteopenia (Low bone mass).

2. No significant change in bone mineral density since prior

examination. 

3. See below National Osteoporosis Foundation guidelines on when

to potentially initiate pharmacologic therapy. 



Based on the National Osteoporosis Foundation Guidelines,

pharmacologic treatment should be initiated in any of the

following, unless clinical conditions suggest otherwise:



*  Any patient with prior fragility fracture of the hip or

vertebrae. A spine fracture indicates 5X risk for subsequent

spine fracture and 2X risk for subsequent hip fracture.



*  Osteoporosis (T-score <-2.5).



*  Postmenopausal women and men age 50 and older with low bone

mass/osteopenia (T-score between -1.0 and -2.5) by DXA and

10-year major osteoporotic fracture greater than 20% or a 10-year

probability of hip fracture greater than 3%. These fracture risks

are supplied above in the FRAX score, if applicable.



*  Clinician judgement and/or patient preferences may indicate

treatment for people with 10-year fracture probabilities above or

below these levels.



Dictated by: 



  Dictated on workstation # [id number]

## 2021-09-10 ENCOUNTER — HOSPITAL ENCOUNTER (OUTPATIENT)
Dept: HOSPITAL 75 - RAD | Age: 71
End: 2021-09-10
Attending: NURSE PRACTITIONER
Payer: MEDICARE

## 2021-09-10 DIAGNOSIS — M48.061: ICD-10-CM

## 2021-09-10 DIAGNOSIS — M47.816: ICD-10-CM

## 2021-09-10 DIAGNOSIS — M47.817: ICD-10-CM

## 2021-09-10 DIAGNOSIS — M24.28: ICD-10-CM

## 2021-09-10 DIAGNOSIS — M51.26: ICD-10-CM

## 2021-09-10 DIAGNOSIS — M48.56XA: Primary | ICD-10-CM

## 2021-09-10 DIAGNOSIS — M48.07: ICD-10-CM

## 2021-09-10 DIAGNOSIS — M43.8X4: ICD-10-CM

## 2021-09-10 PROCEDURE — 72148 MRI LUMBAR SPINE W/O DYE: CPT

## 2021-09-10 NOTE — DIAGNOSTIC IMAGING REPORT
PROCEDURE: MRI lumbar spine.



TECHNIQUE: Multiplanar, multisequence MRI of the lumbar spine was

performed without contrast.



DATE: September 10, 2021.



COMPARISON: CT lumbar spine December 17, 2018.



INDICATION: 71-year-old female, fall in July 2021. Low back pain.



FINDINGS: 

There is a lumbar levoscoliosis. There is grade 1 anterolisthesis

of L4 on L5 measuring 5 mm. There is no evidence of a diffuse

marrow infiltrating or replacing process. There is a prior

compression deformity of T12 with kyphoplasty changes at this

level. There is retropulsion of the posterior superior aspect of

T12 by approximately 4 mm beyond the expected posterior vertebral

body margin with associated mild spinal stenosis. There is an

acute fracture of the L2 vertebral body with roughly 35% height

loss and retropulsion of the posterior inferior aspect of the L2

vertebral body by 5 mm with associated severe spinal stenosis.

The visualized portions of the spinal cord are unremarkable in

signal. The conus medullaris terminates at the level of L1-L2.

There are T2 hyperintense renal lesions not well evaluated on

noncontrast MRI.



L1-L2: There is no disc bulge. There are mild right facet

degenerative changes without ligamentum flavum hypertrophy. There

is no foraminal narrowing. There is no spinal canal stenosis.



L2-L3: There is a diffuse disc bulge. There are facet

degenerative changes with mild ligamentum flavum hypertrophy.

There is prominence of the posterior epidural fat. There is mild

right and severe left foraminal narrowing. There is severe spinal

canal stenosis.



L3-L4: There is mild diffuse disc bulge with moderate narrowing

of the right lateral recess. There are mild right facet

degenerative changes without ligamentum flavum hypertrophy. There

is moderate right and mild to moderate left foraminal narrowing.

There is no spinal canal stenosis.



L4-L5: There is uncovering of the disc relating to the

anterolisthesis as well as mild diffuse disc bulge. There are

bilateral facet degenerative changes. There is severe right and

mild left foraminal narrowing. There is no spinal canal stenosis.



L5-S1: There is no disc bulge. There are advanced bilateral facet

degenerative changes without ligamentum flavum hypertrophy. There

is mild bilateral foraminal narrowing. There is no spinal canal

stenosis.



IMPRESSION: 



1. Acute fracture of the L2 vertebral body with 35% height loss

and retropulsion of the posterior inferior endplate with severe

spinal stenosis. No fracture involvement of the posterior

elements.

2. Multilevel disc and facet degenerative changes of the lumbar

spine as described level by level above.

3. Remote prior compression deformity of T12 as discussed above.

 



Dictated by: 



  Dictated on workstation # [id number]

## 2022-04-27 ENCOUNTER — HOSPITAL ENCOUNTER (OUTPATIENT)
Dept: HOSPITAL 75 - PREOP | Age: 72
LOS: 8 days | Discharge: HOME | End: 2022-05-05
Attending: SPECIALIST
Payer: MEDICARE

## 2022-04-27 VITALS — HEIGHT: 66.02 IN | BODY MASS INDEX: 28.98 KG/M2 | WEIGHT: 180.34 LBS

## 2022-04-27 DIAGNOSIS — Z01.818: Primary | ICD-10-CM

## 2022-05-06 ENCOUNTER — HOSPITAL ENCOUNTER (OUTPATIENT)
Dept: HOSPITAL 75 - SDC | Age: 72
Discharge: HOME | End: 2022-05-06
Attending: SPECIALIST
Payer: MEDICARE

## 2022-05-06 VITALS — DIASTOLIC BLOOD PRESSURE: 90 MMHG | SYSTOLIC BLOOD PRESSURE: 165 MMHG

## 2022-05-06 VITALS — BODY MASS INDEX: 28.98 KG/M2 | WEIGHT: 180.34 LBS | HEIGHT: 66.02 IN

## 2022-05-06 VITALS — SYSTOLIC BLOOD PRESSURE: 165 MMHG | DIASTOLIC BLOOD PRESSURE: 75 MMHG

## 2022-05-06 DIAGNOSIS — Z87.891: ICD-10-CM

## 2022-05-06 DIAGNOSIS — H25.9: Primary | ICD-10-CM

## 2022-05-06 PROCEDURE — 66984 XCAPSL CTRC RMVL W/O ECP: CPT

## 2022-05-06 RX ADMIN — TETRACAINE HYDROCHLORIDE PRN ML: 5 SOLUTION OPHTHALMIC at 12:16

## 2022-05-06 RX ADMIN — TETRACAINE HYDROCHLORIDE PRN ML: 5 SOLUTION OPHTHALMIC at 12:11

## 2022-05-06 RX ADMIN — TROPICAMIDE SCH ML: 10 SOLUTION/ DROPS OPHTHALMIC at 12:16

## 2022-05-06 RX ADMIN — PHENYLEPHRINE HYDROCHLORIDE SCH ML: 100 SOLUTION/ DROPS OPHTHALMIC at 12:16

## 2022-05-06 RX ADMIN — TETRACAINE HYDROCHLORIDE PRN ML: 5 SOLUTION OPHTHALMIC at 12:03

## 2022-05-06 RX ADMIN — PHENYLEPHRINE HYDROCHLORIDE SCH ML: 100 SOLUTION/ DROPS OPHTHALMIC at 12:21

## 2022-05-06 RX ADMIN — TROPICAMIDE SCH ML: 10 SOLUTION/ DROPS OPHTHALMIC at 12:21

## 2022-05-06 RX ADMIN — PHENYLEPHRINE HYDROCHLORIDE SCH ML: 100 SOLUTION/ DROPS OPHTHALMIC at 12:11

## 2022-05-06 RX ADMIN — TROPICAMIDE SCH ML: 10 SOLUTION/ DROPS OPHTHALMIC at 12:11

## 2022-05-06 RX ADMIN — TETRACAINE HYDROCHLORIDE PRN ML: 5 SOLUTION OPHTHALMIC at 12:21

## 2022-05-06 NOTE — ANESTHESIA-GENERAL POST-OP
MAC


Patient Condition


Mental Status/LOC:  Same as Preop


Cardiovascular:  Satisfactory


Nausea/Vomiting:  Absent


Respiratory:  Satisfactory


Pain:  Controlled


Complications:  Absent





Post Op Complications


Complications


None





Follow Up Care/Instructions


Patient Instructions


None needed.





Anesthesiology Discharge Order


Discharge Order


Patient is doing well, no complaints, stable vital signs, no apparent adverse 

anesthesia problems.   


No complications reported per nursing.











MELINDA DURAN CRNA             May 6, 2022 14:10

## 2022-05-06 NOTE — OPHTHALMOLOGY OPERATIVE REPORT
Cataract removal/placement IOL


PREOPERATIVE DIAGNOSIS:    Cataract Left Eye


POSTOPERATIVE DIAGNOSIS: Cataract Left Eye





PROCEDURE: Cataract removal and placement of posterior chamber implant, left eye





SURGEON: Leopoldo Lin 





ANESTHESIA: Topical with sedation





COMPLICATIONS: None





ESTIMATED BLOOD LOSS: Minimal 





DESCRIPTION OF PROCEDURE:


After proper informed consent was obtained, the patient, a 72 female, was taken 

to the Operating Room and the left eye was anesthetized with tetracaine.  The 

left eye was then prepped and draped in the usual manner.  A wire lid speculum 

was placed. A paracentesis was made at the left hand position. Preservative free

lidocaine was injected into the anterior chamber followed by viscoelastic.  A 

clear corneal incision was made in the temporal position. A capsulorrhexis was 

preformed and the central nuclear and cortical material were removed.  The 

posterior capsule was polished and an Jose De Jesus 25.5 [id number] was placed into the 

capsular bag. The residual viscoelastic was aspirated and balanced saline 

solution was injected into the anterior chamber.  Moxifloxacin was injected into

the anterior chamber.





The wound was checked and found to be water tight.





The patient tolerated the procedure well without complications.











LEOPOLDO LIN MD              May 6, 2022 13:21

## 2022-05-06 NOTE — OPHTHALMOLOGIST PRE-OP NOTE
Pre-Operative Progress Note


H&P Reviewed


The H&P was reviewed, patient examined and no changes noted.


Date H&P Reviewed:  May 6, 2022


Time H&P Reviewed:  13:00


Pre-Op Dx


Cataract, Left Eye











RUBIN LIN MD              May 6, 2022 13:21

## 2022-05-16 ENCOUNTER — HOSPITAL ENCOUNTER (OUTPATIENT)
Dept: HOSPITAL 75 - SDC | Age: 72
Discharge: HOME | End: 2022-05-16
Attending: SPECIALIST
Payer: MEDICARE

## 2022-05-16 VITALS — SYSTOLIC BLOOD PRESSURE: 142 MMHG | DIASTOLIC BLOOD PRESSURE: 69 MMHG

## 2022-05-16 VITALS — DIASTOLIC BLOOD PRESSURE: 83 MMHG | SYSTOLIC BLOOD PRESSURE: 136 MMHG

## 2022-05-16 VITALS — BODY MASS INDEX: 28.98 KG/M2 | WEIGHT: 180.34 LBS | HEIGHT: 66.02 IN

## 2022-05-16 DIAGNOSIS — Z87.891: ICD-10-CM

## 2022-05-16 DIAGNOSIS — H25.9: Primary | ICD-10-CM

## 2022-05-16 PROCEDURE — 66984 XCAPSL CTRC RMVL W/O ECP: CPT

## 2022-05-16 RX ADMIN — PHENYLEPHRINE HYDROCHLORIDE SCH ML: 100 SOLUTION/ DROPS OPHTHALMIC at 10:42

## 2022-05-16 RX ADMIN — TETRACAINE HYDROCHLORIDE PRN ML: 5 SOLUTION OPHTHALMIC at 10:38

## 2022-05-16 RX ADMIN — TROPICAMIDE SCH ML: 10 SOLUTION/ DROPS OPHTHALMIC at 10:36

## 2022-05-16 RX ADMIN — TETRACAINE HYDROCHLORIDE PRN ML: 5 SOLUTION OPHTHALMIC at 10:33

## 2022-05-16 RX ADMIN — PHENYLEPHRINE HYDROCHLORIDE SCH ML: 100 SOLUTION/ DROPS OPHTHALMIC at 10:33

## 2022-05-16 RX ADMIN — TROPICAMIDE SCH ML: 10 SOLUTION/ DROPS OPHTHALMIC at 10:42

## 2022-05-16 RX ADMIN — PHENYLEPHRINE HYDROCHLORIDE SCH ML: 100 SOLUTION/ DROPS OPHTHALMIC at 10:36

## 2022-05-16 RX ADMIN — TETRACAINE HYDROCHLORIDE PRN ML: 5 SOLUTION OPHTHALMIC at 10:31

## 2022-05-16 RX ADMIN — TROPICAMIDE SCH ML: 10 SOLUTION/ DROPS OPHTHALMIC at 10:32

## 2022-05-16 NOTE — OPHTHALMOLOGIST PRE-OP NOTE
Pre-Operative Progress Note


H&P Reviewed


The H&P was reviewed, patient examined and no changes noted.


Date H&P Reviewed:  May 16, 2022


Time H&P Reviewed:  11:09


Pre-Op Dx


Cataract, Right Eye











RUBIN LIN MD             May 16, 2022 11:10

## 2022-05-16 NOTE — ANESTHESIA-GENERAL POST-OP
MAC


Patient Condition


Mental Status/LOC:  Same as Preop


Cardiovascular:  Satisfactory


Nausea/Vomiting:  Absent


Respiratory:  Satisfactory


Pain:  Controlled


Complications:  Absent





Post Op Complications


Complications


None





Follow Up Care/Instructions


Patient Instructions


None needed.





Anesthesiology Discharge Order


Discharge Order


Patient is doing well, no complaints, stable vital signs, no apparent adverse 

anesthesia problems.   


No complications reported per nursing.











ROZINA GODOY CRNA          May 16, 2022 13:41

## 2022-05-16 NOTE — OPHTHALMOLOGY OPERATIVE REPORT
Cataract removal/placement IOL


PREOPERATIVE DIAGNOSIS: Cataract Right Eye


POSTOPERATIVE DIAGNOSIS: Cataract Right Eye





PROCEDURE: Cataract removal and placement of posterior chamber implant, right 

eye





SURGEON: Leopoldo Lin 





ANESTHESIA: Topical with sedation





COMPLICATIONS: None





ESTIMATED BLOOD LOSS: Minimal 





DESCRIPTION OF PROCEDURE:


After proper informed consent was obtained, the patient, a [id number] female, was taken 

to the Operating Room and the right eye was anesthetized with tetracaine.  The 

right eye was then prepped and draped in the usual manner.  A wire lid speculum 

was placed. A paracentesis was made at the left hand position. Preservative free

lidocaine was injected into the anterior chamber followed by viscoelastic.  A 

clear corneal incision was made in the temporal position. A capsulorrhexis was 

preformed and the central nuclear and cortical material were removed.  The 

posterior capsule was polished and Jose De Jesus 25.5 [id number]  IOL was placed into the 

capsular bag. The residual viscoelastic was aspirated and balanced saline 

solution was injected into the anterior chamber. Moxifloxacin  was injected into

the anterior chamber.





The wound was checked and found to be water tight.





The patient tolerated the procedure well without complications.











LEOPOLDO LIN MD             May 16, 2022 11:34

## 2023-10-30 ENCOUNTER — HOSPITAL ENCOUNTER (OUTPATIENT)
Dept: HOSPITAL 75 - RAD | Age: 73
End: 2023-10-30
Attending: NURSE PRACTITIONER
Payer: MEDICARE

## 2023-10-30 DIAGNOSIS — M79.89: Primary | ICD-10-CM

## 2023-10-30 DIAGNOSIS — K51.90: ICD-10-CM

## 2023-10-30 DIAGNOSIS — M79.7: ICD-10-CM

## 2023-10-30 DIAGNOSIS — L03.116: ICD-10-CM

## 2023-10-30 NOTE — DIAGNOSTIC IMAGING REPORT
PROCEDURE: US left lower extremity venous.



TECHNIQUE: Multiple real-time grayscale images were obtained over

the left lower extremity in various projections. Additional

duplex Doppler and color Doppler images were also obtained.



INDICATION: Left leg pain.



There is no evidence of left lower extremity DVT. Left lower

extremity DVT venous system shows normal compressibility with

normal response to augmentation and Valsalva. No fluid collection

or mass is detected.



IMPRESSION: No evidence of left lower extremity DVT.



Dictated by: 



  Dictated on workstation # [id number]